# Patient Record
Sex: MALE | Race: WHITE | NOT HISPANIC OR LATINO
[De-identification: names, ages, dates, MRNs, and addresses within clinical notes are randomized per-mention and may not be internally consistent; named-entity substitution may affect disease eponyms.]

---

## 2022-05-25 PROBLEM — Z00.00 ENCOUNTER FOR PREVENTIVE HEALTH EXAMINATION: Status: ACTIVE | Noted: 2022-05-25

## 2022-05-27 ENCOUNTER — APPOINTMENT (OUTPATIENT)
Dept: PEDIATRIC ORTHOPEDIC SURGERY | Facility: CLINIC | Age: 22
End: 2022-05-27
Payer: COMMERCIAL

## 2022-05-27 ENCOUNTER — APPOINTMENT (OUTPATIENT)
Dept: PEDIATRIC ORTHOPEDIC SURGERY | Facility: CLINIC | Age: 22
End: 2022-05-27

## 2022-05-27 VITALS — WEIGHT: 144.44 LBS

## 2022-05-27 DIAGNOSIS — Z86.59 PERSONAL HISTORY OF OTHER MENTAL AND BEHAVIORAL DISORDERS: ICD-10-CM

## 2022-05-27 DIAGNOSIS — G80.0 SPASTIC QUADRIPLEGIC CEREBRAL PALSY: ICD-10-CM

## 2022-05-27 DIAGNOSIS — T74.4XXA: ICD-10-CM

## 2022-05-27 DIAGNOSIS — Z86.69 PERSONAL HISTORY OF OTHER DISEASES OF THE NERVOUS SYSTEM AND SENSE ORGANS: ICD-10-CM

## 2022-05-27 DIAGNOSIS — F63.81 INTERMITTENT EXPLOSIVE DISORDER: ICD-10-CM

## 2022-05-27 PROCEDURE — 99072 ADDL SUPL MATRL&STAF TM PHE: CPT

## 2022-05-27 PROCEDURE — 99202 OFFICE O/P NEW SF 15 MIN: CPT

## 2022-05-27 NOTE — PHYSICAL EXAM
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  He has reasonable motion to the ankle and subtalar joint passively.  No points of tenderness present.  Moderate contractures of the hips and knees that do not interfere with his function.  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

## 2022-05-27 NOTE — ASSESSMENT
[FreeTextEntry1] : Impression: Spastic quadriparesis.\par \par A custom AFO has been ordered for the right side return on a as needed basis

## 2022-05-27 NOTE — HISTORY OF PRESENT ILLNESS
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  This child is in a group home and is a minimal ambulator with assistance.  The facility has requested evaluation for bracing of the right foot.  Apparently in the past he was treated with a KAFO which she refuses to wear.  Along with his spastic quadriparesis there is significant autism and is somewhat of a behavioral problem.  Past history other than the above is unremarkable